# Patient Record
Sex: MALE | Race: BLACK OR AFRICAN AMERICAN | NOT HISPANIC OR LATINO | Employment: UNEMPLOYED | URBAN - METROPOLITAN AREA
[De-identification: names, ages, dates, MRNs, and addresses within clinical notes are randomized per-mention and may not be internally consistent; named-entity substitution may affect disease eponyms.]

---

## 2022-05-03 ENCOUNTER — HOSPITAL ENCOUNTER (EMERGENCY)
Facility: HOSPITAL | Age: 22
Discharge: HOME | End: 2022-05-03
Attending: INTERNAL MEDICINE
Payer: MEDICAID

## 2022-05-03 VITALS
OXYGEN SATURATION: 99 % | WEIGHT: 216.94 LBS | HEART RATE: 88 BPM | BODY MASS INDEX: 29.38 KG/M2 | RESPIRATION RATE: 18 BRPM | SYSTOLIC BLOOD PRESSURE: 127 MMHG | HEIGHT: 72 IN | TEMPERATURE: 98 F | DIASTOLIC BLOOD PRESSURE: 85 MMHG

## 2022-05-03 DIAGNOSIS — R10.13 EPIGASTRIC PAIN: ICD-10-CM

## 2022-05-03 DIAGNOSIS — K52.9 GASTROENTERITIS: Primary | ICD-10-CM

## 2022-05-03 PROCEDURE — 99284 EMERGENCY DEPT VISIT MOD MDM: CPT | Mod: 25

## 2022-05-03 PROCEDURE — 25000003 PHARM REV CODE 250: Performed by: INTERNAL MEDICINE

## 2022-05-03 PROCEDURE — 93005 ELECTROCARDIOGRAM TRACING: CPT

## 2022-05-03 RX ORDER — FAMOTIDINE 20 MG/1
20 TABLET, FILM COATED ORAL 2 TIMES DAILY
Qty: 30 TABLET | Refills: 0 | Status: SHIPPED | OUTPATIENT
Start: 2022-05-03 | End: 2022-05-18

## 2022-05-03 RX ORDER — ONDANSETRON 4 MG/1
4 TABLET, ORALLY DISINTEGRATING ORAL
Status: COMPLETED | OUTPATIENT
Start: 2022-05-03 | End: 2022-05-03

## 2022-05-03 RX ORDER — ONDANSETRON 4 MG/1
4 TABLET, FILM COATED ORAL EVERY 8 HOURS PRN
Qty: 12 TABLET | Refills: 0 | Status: SHIPPED | OUTPATIENT
Start: 2022-05-03

## 2022-05-03 RX ORDER — ALUMINUM HYDROXIDE, MAGNESIUM HYDROXIDE, AND SIMETHICONE 2400; 240; 2400 MG/30ML; MG/30ML; MG/30ML
30 SUSPENSION ORAL ONCE
Status: COMPLETED | OUTPATIENT
Start: 2022-05-03 | End: 2022-05-03

## 2022-05-03 RX ADMIN — ALUMINUM HYDROXIDE, MAGNESIUM HYDROXIDE, DIMETHICONE 30 ML: 400; 400; 40 SUSPENSION ORAL at 07:05

## 2022-05-03 RX ADMIN — ONDANSETRON 4 MG: 4 TABLET, ORALLY DISINTEGRATING ORAL at 07:05

## 2022-05-03 NOTE — DISCHARGE INSTRUCTIONS
[facility] Primary Care Providers        [facility]  MD Yue Viera, HARRIS  [address]  [place], [place] [address] [phone] [facility]  Malia Mendiola NP  [address] [place] [address]  [phone]   [facility]  HARRIS Buchanan MD Mark Dawson, MD Danielle Duhon, MD  [address] [place] [address]       [phone] [facility]  [address] [place]  [address]  [phone]   [facility]  [address] [facility] [place] [address]  [phone]    [facility]  Zenobia Eldridge, HARRIS  [address] [place] [address]  [phone] [facility]  Bianca Mo NP  [address], [place] [address] [phone]   [facility]  MD Lesli Shen MD, MD Ave Marie MD  [address]  [place], [place] [address] [phone] [facility]  MD Hari Tristan MD  [address] [facility], [place] [address] [phone]   Christian Nava NP  [address] [facility] [facility], [place] [address] [phone] [facility]  HARRIS Singh, HARRIS  [address], [place]  [phone]   [facility]  MD Sana Gutierres MD Paul B. Stringfellow, MD  [address] [place] [address]  [phone] [facility]   Haris Ewing MD  [address] [facility], [place] [address] [phone]   [facility]  [address] [place], [place] [address] [phone]  PRISCILLA Remy MD  [address], [place] [address] [phone]   [facility]  Malia Don NP  [address] [address] [phone] Yoandy Husain,  NP  [address] [place] [address]  [phone]   [facility]  [address] [facility] [place]  [address]  [phone] [facility]  [address]  [place]  [address]  [phone]   [facility]   [address] [place] [address]  [phone] Desi Berry MD  [address] [place]  [address] [place] [address]  [phone]     [address] [place] [address]  [phone] [facility]  [address] [facility] [place]  [address]  [phone]       [facility] Pediatric Care Providers    [facility]  DO Jose Armando Rojas MD  [address] [place] [address]  [phone] Giselle Gusman MD  [address] [facility] [place] [address]  [phone]   [facility]  Mayannick Hussein, CHAPO  [address] [place] [address]  [phone] Marian Christopher MD  [address] [place] [address]  [phone]   Revised /14/2021     Followup with one of the local providers for further care.            The exam and treatment you received in Emergency Room was for an urgent problem and NOT INTENDED AS COMPLETE CARE. It is important that you FOLLOW UP with a doctor for ongoing care. If your symptoms become WORSE or you DO NOT IMPROVE and you are unable to reach your health care provider, you should RETURN to the emergency department. The Emergency Room doctor has provided a PRELIMINARY INTERPRETATION of all your STUDIES. A final interpretation may be done after you are discharged. IF A CHANGE in your diagnosis or treatment is needed WE WILL CONTACT YOU. It is critical that we have a CURRENT PHONE NUMBER FOR YOU.

## 2022-05-03 NOTE — ED PROVIDER NOTES
Encounter Date: 5/3/2022       History     Chief Complaint   Patient presents with    Abdominal Pain     Pt c/o epigastric pain that began last night. No relief with tums     21-year-old male with no significant past medical history comes to the emergency room with complaint of epigastric pain which was started yesterday after eating some spicy food.  Patient stated mainly burning in the upper abdomen and chest.  It started having some nausea and vomiting, patient denies any other complaints whatsoever.  And he has never had any problem like this before.        Review of patient's allergies indicates:  No Known Allergies  History reviewed. No pertinent past medical history.  History reviewed. No pertinent surgical history.  History reviewed. No pertinent family history.  Social History     Tobacco Use    Smoking status: Current Every Day Smoker     Types: Vaping with nicotine    Smokeless tobacco: Never Used   Substance Use Topics    Alcohol use: Not Currently    Drug use: Never     Review of Systems   HENT: Negative for trouble swallowing and voice change.    Eyes: Negative for visual disturbance.   Respiratory: Negative for cough and shortness of breath.    Cardiovascular: Negative for chest pain.   Gastrointestinal: Positive for abdominal pain. Negative for diarrhea and vomiting.        Mainly epigastric burning in nature   Genitourinary: Negative for dysuria and hematuria.   Musculoskeletal: Negative for gait problem.        No Deformity   Skin: Negative for color change and rash.   Neurological: Negative for headaches.   Psychiatric/Behavioral: Negative for behavioral problems and sleep disturbance.   All other systems reviewed and are negative.      Physical Exam     Initial Vitals [05/03/22 0637]   BP Pulse Resp Temp SpO2   (!) 147/90 78 16 97.7 °F (36.5 °C) 100 %      MAP       --         Physical Exam    Nursing note and vitals reviewed.  Constitutional: No distress.   HENT:   Head: Atraumatic.   Eyes:  EOM are normal.   Cardiovascular: Normal heart sounds.   Pulmonary/Chest: Breath sounds normal.   Abdominal: Abdomen is soft. Bowel sounds are normal.   Musculoskeletal:         General: Normal range of motion.      Cervical back: No bony tenderness.     Neurological: He is alert.   Speech Normal   Skin: Skin is dry.   Psychiatric: He has a normal mood and affect.   Pleasant         ED Course   Procedures  Labs Reviewed - No data to display  EKG Readings: (Independently Interpreted)   Initial Reading: No STEMI. Rhythm: Normal Sinus Rhythm. Ectopy: No Ectopy. Conduction: Normal. Axis: Normal. Clinical Impression: Normal Sinus Rhythm   07:08           Imaging Results    None          Medications   ondansetron disintegrating tablet 4 mg (4 mg Oral Given 5/3/22 0723)   aluminum & magnesium hydroxide-simethicone 400-400-40 mg/5 mL suspension 30 mL (30 mLs Oral Given 5/3/22 0700)     Medical Decision Making:   Initial Assessment:   Patient essentially does not have any significant medical condition, and it all started after the weekend the patient said he might have eaten some spicy food and started the problem.  Denies any other complaints whatsoever, patient has not been on any medication, he never had any surgery, does no significant medical history in the family.             ED Course as of 05/03/22 0737   Tue May 03, 2022   0728 Patient feels better and thinks that he can go home.  I will put patient on Zofran, and Pepcid and let him go home. [GQ]      ED Course User Index  [GQ] Thu Alegre MD             Clinical Impression:   Final diagnoses:  [R10.13] Epigastric pain  [K52.9] Gastroenteritis (Primary)          ED Disposition Condition    Discharge Stable        ED Prescriptions     Medication Sig Dispense Start Date End Date Auth. Provider    famotidine (PEPCID) 20 MG tablet Take 1 tablet (20 mg total) by mouth 2 (two) times daily. for 15 days 30 tablet 5/3/2022 5/18/2022 Thu Alegre MD    ondansetron  (ZOFRAN) 4 MG tablet Take 1 tablet (4 mg total) by mouth every 8 (eight) hours as needed for Nausea. 12 tablet 5/3/2022  Thu Alegre MD        Follow-up Information    None          Thu Alegre MD  05/03/22 0714

## 2023-01-16 VITALS
RESPIRATION RATE: 20 BRPM | TEMPERATURE: 99 F | DIASTOLIC BLOOD PRESSURE: 91 MMHG | SYSTOLIC BLOOD PRESSURE: 145 MMHG | HEART RATE: 73 BPM | BODY MASS INDEX: 35.85 KG/M2 | WEIGHT: 242.06 LBS | HEIGHT: 69 IN | OXYGEN SATURATION: 100 %

## 2023-01-17 ENCOUNTER — HOSPITAL ENCOUNTER (EMERGENCY)
Facility: HOSPITAL | Age: 23
Discharge: HOME | End: 2023-01-17
Attending: FAMILY MEDICINE
Payer: MEDICAID

## 2023-01-17 ENCOUNTER — HOSPITAL ENCOUNTER (EMERGENCY)
Facility: HOSPITAL | Age: 23
Discharge: HOME | End: 2023-01-17
Attending: INTERNAL MEDICINE
Payer: MEDICAID

## 2023-01-17 VITALS
TEMPERATURE: 99 F | RESPIRATION RATE: 20 BRPM | BODY MASS INDEX: 36.49 KG/M2 | HEIGHT: 68 IN | WEIGHT: 240.75 LBS | HEART RATE: 68 BPM | DIASTOLIC BLOOD PRESSURE: 84 MMHG | OXYGEN SATURATION: 98 % | SYSTOLIC BLOOD PRESSURE: 145 MMHG

## 2023-01-17 DIAGNOSIS — K08.89 PAIN, DENTAL: Primary | ICD-10-CM

## 2023-01-17 DIAGNOSIS — K04.7 DENTAL INFECTION: ICD-10-CM

## 2023-01-17 PROCEDURE — 96372 THER/PROPH/DIAG INJ SC/IM: CPT | Performed by: INTERNAL MEDICINE

## 2023-01-17 PROCEDURE — 64400 NJX AA&/STRD TRIGEMINAL NRV: CPT | Mod: LT

## 2023-01-17 PROCEDURE — 25000003 PHARM REV CODE 250: Performed by: FAMILY MEDICINE

## 2023-01-17 PROCEDURE — 99284 EMERGENCY DEPT VISIT MOD MDM: CPT

## 2023-01-17 PROCEDURE — 63600175 PHARM REV CODE 636 W HCPCS: Performed by: FAMILY MEDICINE

## 2023-01-17 PROCEDURE — 99284 EMERGENCY DEPT VISIT MOD MDM: CPT | Mod: 25,27

## 2023-01-17 PROCEDURE — 63600175 PHARM REV CODE 636 W HCPCS: Performed by: INTERNAL MEDICINE

## 2023-01-17 PROCEDURE — 25000003 PHARM REV CODE 250: Performed by: INTERNAL MEDICINE

## 2023-01-17 PROCEDURE — 96372 THER/PROPH/DIAG INJ SC/IM: CPT | Mod: 59 | Performed by: FAMILY MEDICINE

## 2023-01-17 RX ORDER — KETOROLAC TROMETHAMINE 30 MG/ML
60 INJECTION, SOLUTION INTRAMUSCULAR; INTRAVENOUS
Status: COMPLETED | OUTPATIENT
Start: 2023-01-17 | End: 2023-01-17

## 2023-01-17 RX ORDER — IBUPROFEN 800 MG/1
800 TABLET ORAL EVERY 8 HOURS PRN
Qty: 30 TABLET | Refills: 0 | Status: SHIPPED | OUTPATIENT
Start: 2023-01-17 | End: 2023-01-27

## 2023-01-17 RX ORDER — AMOXICILLIN 500 MG/1
500 CAPSULE ORAL EVERY 12 HOURS
Qty: 14 CAPSULE | Refills: 0 | Status: SHIPPED | OUTPATIENT
Start: 2023-01-17 | End: 2023-01-24

## 2023-01-17 RX ORDER — LIDOCAINE HCL/EPINEPHRINE/PF 2%-1:200K
1 VIAL (ML) INJECTION
Status: COMPLETED | OUTPATIENT
Start: 2023-01-17 | End: 2023-01-17

## 2023-01-17 RX ORDER — LIDOCAINE HYDROCHLORIDE 20 MG/ML
5 SOLUTION OROPHARYNGEAL
Status: COMPLETED | OUTPATIENT
Start: 2023-01-17 | End: 2023-01-17

## 2023-01-17 RX ORDER — AMOXICILLIN 250 MG/1
500 CAPSULE ORAL
Status: COMPLETED | OUTPATIENT
Start: 2023-01-17 | End: 2023-01-17

## 2023-01-17 RX ORDER — LIDOCAINE HCL/EPINEPHRINE/PF 2%-1:200K
1 VIAL (ML) INJECTION ONCE
Status: DISCONTINUED | OUTPATIENT
Start: 2023-01-17 | End: 2023-01-17

## 2023-01-17 RX ADMIN — LIDOCAINE HYDROCHLORIDE 5 ML: 20 SOLUTION ORAL at 01:01

## 2023-01-17 RX ADMIN — LIDOCAINE HYDROCHLORIDE,EPINEPHRINE BITARTRATE 1 ML: 20; .005 INJECTION, SOLUTION EPIDURAL; INFILTRATION; INTRACAUDAL; PERINEURAL at 10:01

## 2023-01-17 RX ADMIN — KETOROLAC TROMETHAMINE 60 MG: 30 INJECTION, SOLUTION INTRAMUSCULAR at 01:01

## 2023-01-17 RX ADMIN — KETOROLAC TROMETHAMINE 60 MG: 30 INJECTION, SOLUTION INTRAMUSCULAR; INTRAVENOUS at 10:01

## 2023-01-17 RX ADMIN — AMOXICILLIN 500 MG: 250 CAPSULE ORAL at 01:01

## 2023-01-17 NOTE — ED PROVIDER NOTES
Encounter Date: 1/16/2023       History     Chief Complaint   Patient presents with    Dental Pain     Pt c/o pain to r lower back tooth x 3 days.     22-year-old black male presents to the emergency department complaining of right lower molar pain x3 days.  States he has a dentist appointment tomorrow but he could not take the pain anymore so he came to the emergency department tonight.  He states it is the tooth hurting that is got the silver cap on it.  He denies nausea vomiting diarrhea fever chills    Review of patient's allergies indicates:  No Known Allergies  History reviewed. No pertinent past medical history.  History reviewed. No pertinent surgical history.  History reviewed. No pertinent family history.  Social History     Tobacco Use    Smoking status: Every Day     Types: Vaping with nicotine    Smokeless tobacco: Never   Substance Use Topics    Alcohol use: Not Currently    Drug use: Never     Review of Systems   Constitutional: Negative.  Negative for activity change, appetite change, chills, diaphoresis, fatigue, fever and unexpected weight change.   HENT:  Positive for dental problem. Negative for congestion, drooling, ear discharge, ear pain, facial swelling, hearing loss, mouth sores, nosebleeds, postnasal drip, rhinorrhea, sinus pressure, sinus pain, sneezing, sore throat, tinnitus, trouble swallowing and voice change.    Eyes: Negative.  Negative for photophobia, pain, discharge, redness, itching and visual disturbance.   Respiratory: Negative.  Negative for apnea, cough, choking, chest tightness, shortness of breath, wheezing and stridor.    Cardiovascular: Negative.  Negative for chest pain, palpitations and leg swelling.   Gastrointestinal: Negative.  Negative for abdominal distention, abdominal pain, anal bleeding, blood in stool, constipation, diarrhea, nausea, rectal pain and vomiting.   Endocrine: Negative.  Negative for cold intolerance, heat intolerance, polydipsia, polyphagia and  polyuria.   Genitourinary: Negative.  Negative for decreased urine volume, difficulty urinating, dysuria, enuresis, flank pain, frequency, genital sores, hematuria, penile discharge, penile pain, penile swelling, scrotal swelling, testicular pain and urgency.   Musculoskeletal: Negative.  Negative for arthralgias, back pain, gait problem, joint swelling, myalgias, neck pain and neck stiffness.   Skin: Negative.  Negative for color change, pallor, rash and wound.   Allergic/Immunologic: Negative.  Negative for environmental allergies, food allergies and immunocompromised state.   Neurological: Negative.  Negative for dizziness, tremors, seizures, syncope, facial asymmetry, speech difficulty, weakness, light-headedness, numbness and headaches.   Hematological: Negative.  Negative for adenopathy. Does not bruise/bleed easily.   Psychiatric/Behavioral: Negative.  Negative for agitation, behavioral problems, confusion, decreased concentration, dysphoric mood, hallucinations, self-injury, sleep disturbance and suicidal ideas. The patient is not nervous/anxious and is not hyperactive.    All other systems reviewed and are negative.    Physical Exam     Initial Vitals [01/16/23 2223]   BP Pulse Resp Temp SpO2   (!) 145/91 73 20 98.9 °F (37.2 °C) 100 %      MAP       --         Physical Exam    Constitutional: He appears well-developed and well-nourished.   HENT:   Head: Normocephalic and atraumatic.   Mouth/Throat:       Neck: Neck supple.   Normal range of motion.  Musculoskeletal:         General: Normal range of motion.      Cervical back: Normal range of motion and neck supple.     Neurological: He is alert.   Skin: Skin is warm.   Psychiatric: He has a normal mood and affect.       ED Course   Procedures  Labs Reviewed - No data to display       Imaging Results    None          Medications   ketorolac injection 60 mg (has no administration in time range)   amoxicillin capsule 500 mg (has no administration in time  range)   LIDOcaine HCl 2% oral solution 5 mL (has no administration in time range)                              Clinical Impression:   Final diagnoses:  [K08.89] Pain, dental (Primary)  [K04.7] Dental infection        ED Disposition Condition    Discharge Stable          ED Prescriptions       Medication Sig Dispense Start Date End Date Auth. Provider    amoxicillin (AMOXIL) 500 MG capsule Take 1 capsule (500 mg total) by mouth every 12 (twelve) hours. for 7 days 14 capsule 1/17/2023 1/24/2023 Ramses Franco MD          Follow-up Information       Follow up With Specialties Details Why Contact Info    Jose Armando Carrion MD Family Medicine In 1 week  [address] [place] [address]  [phone]      Dentist  In 1 day              Delores Zaman is a certified MA and was present during the entire interaction with this patient      Ramses Franco MD  01/17/23 0053

## 2023-01-18 NOTE — ED PROVIDER NOTES
Encounter Date: 1/17/2023       History     Chief Complaint   Patient presents with    Dental Pain     Pt c/o right lower tooth pain. Pt states he went to dentist today and was put on antibiotics and pain medication but meds are not working.     22-year-old gentleman presents emergency room complaints of dental pain.  Patient reports that he has been having dental pain for the last week.  He saw the dentist today, was prescribed amoxicillin and Tylenol No. 3, but reports he is still having pain.  Denies fever chills.  Denies nausea or vomiting.  Denies facial swelling.    The history is provided by the patient.   Review of patient's allergies indicates:  No Known Allergies  History reviewed. No pertinent past medical history.  History reviewed. No pertinent surgical history.  History reviewed. No pertinent family history.  Social History     Tobacco Use    Smoking status: Every Day     Types: Vaping with nicotine    Smokeless tobacco: Never   Substance Use Topics    Alcohol use: Not Currently    Drug use: Never     Review of Systems   Constitutional:  Negative for chills, fatigue and fever.   HENT:  Positive for dental problem. Negative for ear pain, rhinorrhea and sore throat.    Eyes:  Negative for photophobia and pain.   Respiratory:  Negative for cough, shortness of breath and wheezing.    Cardiovascular:  Negative for chest pain.   Gastrointestinal:  Negative for abdominal pain, diarrhea, nausea and vomiting.   Genitourinary:  Negative for dysuria.   Neurological:  Negative for dizziness, weakness and headaches.   All other systems reviewed and are negative.    Physical Exam     Initial Vitals [01/17/23 2137]   BP Pulse Resp Temp SpO2   (!) 145/84 68 20 98.8 °F (37.1 °C) 98 %      MAP       --         Physical Exam    Nursing note and vitals reviewed.  Constitutional: He appears well-developed and well-nourished.   HENT:   Head: Normocephalic and atraumatic.   Patient has a silver cap over the right mandibular  1st molar.  No gumline swelling appreciated.  Patient reports this tooth is the one giving him discomfort.   Eyes: EOM are normal. Pupils are equal, round, and reactive to light.   Neck: Neck supple.   Normal range of motion.  Cardiovascular:  Normal rate, regular rhythm and normal heart sounds.     Exam reveals no gallop and no friction rub.       No murmur heard.  Pulmonary/Chest: Breath sounds normal. No respiratory distress.   Abdominal: Abdomen is soft. Bowel sounds are normal. He exhibits no distension. There is no abdominal tenderness.   Musculoskeletal:         General: Normal range of motion.      Cervical back: Normal range of motion and neck supple.     Neurological: He is alert and oriented to person, place, and time. He has normal strength.   Skin: Skin is warm and dry. Capillary refill takes less than 2 seconds.   Psychiatric: He has a normal mood and affect. His behavior is normal. Judgment and thought content normal.       ED Course   Procedures  Left inferior alveolar nerve block:  01/17/2023 10:16 PM  Risks / benefits discussed with the patient, a 25 gauge needle used to inject 1mL of marcaine 2% lidocaine with epi into the right pterygomandibular raphe.  Complete resolution of pain.  Patient tolerated well.  No blood loss.  No complications.  Performed by Milton Weller MD.      Labs Reviewed - No data to display       Imaging Results    None          Medications   ketorolac injection 60 mg (60 mg Intramuscular Given 1/17/23 2208)   LIDOcaine-EPINEPHrine (PF) 2%-1:200,000 injection 1 mL (1 mL Other Given 1/17/23 2213)     Medical Decision Making:   Initial Assessment:   Patient having dental pain right lower 1st molar.  Discussed options with the patient including inferior alveolar block with bupivacaine, the patient does not desire.  Patient reports getting good relief in the past with Toradol and requesting this medication.  Will give a Toradol IM injection here in the emergency room, and  will send a prescription for ibuprofen 800 mg of the pharmacy for the patient to  tomorrow.  Patient informed to use the ibuprofen to help with dental pain, and may use Tylenol with codeine to help with throat pain.  Patient reports understanding.  Will follow up with dentist.  ER precautions given for any acute worsening.           ED Course as of 01/17/23 2217 Tue Jan 17, 2023 2216 Patient decided to take the injection as well.  Inferior alveolar block performed.  Patient feeling improved.  Stable for discharge home. [MW]      ED Course User Index  [MW] Milton Weller MD                 Clinical Impression:   Final diagnoses:  [K08.89] Pain, dental (Primary)        ED Disposition Condition    Discharge Stable          ED Prescriptions       Medication Sig Dispense Start Date End Date Auth. Provider    ibuprofen (ADVIL,MOTRIN) 800 MG tablet Take 1 tablet (800 mg total) by mouth every 8 (eight) hours as needed for Pain. 30 tablet 1/17/2023 1/27/2023 Milton Weller MD          Follow-up Information       Follow up With Specialties Details Why Contact Info    [facility] Emergency Dept Emergency Medicine  As needed, If symptoms worsen [address]  [facility] [address]  [phone]    Dentist                 Milton Weller MD  01/17/23 2150       Milton Weller MD  01/17/23 2217